# Patient Record
Sex: MALE | Race: OTHER | NOT HISPANIC OR LATINO | URBAN - METROPOLITAN AREA
[De-identification: names, ages, dates, MRNs, and addresses within clinical notes are randomized per-mention and may not be internally consistent; named-entity substitution may affect disease eponyms.]

---

## 2024-05-15 ENCOUNTER — INPATIENT (INPATIENT)
Facility: HOSPITAL | Age: 69
LOS: 0 days | Discharge: HOME | DRG: 245 | End: 2024-05-16
Attending: INTERNAL MEDICINE | Admitting: INTERNAL MEDICINE
Payer: MEDICARE

## 2024-05-15 VITALS
DIASTOLIC BLOOD PRESSURE: 68 MMHG | SYSTOLIC BLOOD PRESSURE: 108 MMHG | TEMPERATURE: 98 F | HEART RATE: 77 BPM | RESPIRATION RATE: 18 BRPM | OXYGEN SATURATION: 97 %

## 2024-05-15 LAB
ISTAT INR: 1.2 — HIGH (ref 0.88–1.16)
ISTAT PT: 13.9 SEC — HIGH (ref 10–12.9)
ISTAT VENOUS BE: 3 MMOL/L — SIGNIFICANT CHANGE UP (ref -2–3)
ISTAT VENOUS GLUCOSE: 97 MG/DL — SIGNIFICANT CHANGE UP (ref 70–99)
ISTAT VENOUS HCO3: 31 MMOL/L — HIGH (ref 23–28)
ISTAT VENOUS HEMATOCRIT: 40 % — SIGNIFICANT CHANGE UP (ref 39–50)
ISTAT VENOUS HEMOGLOBIN: 13.6 GM/DL — SIGNIFICANT CHANGE UP (ref 13–17)
ISTAT VENOUS IONIZED CALCIUM: 1.28 MMOL/L — SIGNIFICANT CHANGE UP (ref 1.12–1.3)
ISTAT VENOUS PCO2: 61 MMHG — HIGH (ref 41–51)
ISTAT VENOUS PH: 7.32 — SIGNIFICANT CHANGE UP (ref 7.31–7.41)
ISTAT VENOUS PO2: <66 MMHG — LOW (ref 35–40)
ISTAT VENOUS POTASSIUM: 3.8 MMOL/L — SIGNIFICANT CHANGE UP (ref 3.5–5.3)
ISTAT VENOUS SO2: 19 % — SIGNIFICANT CHANGE UP
ISTAT VENOUS SODIUM: 141 MMOL/L — SIGNIFICANT CHANGE UP (ref 135–145)
ISTAT VENOUS TCO2: 33 MMOL/L — HIGH (ref 22–31)
POCT ISTAT CREATININE: 1.4 MG/DL — HIGH (ref 0.5–1.3)

## 2024-05-15 PROCEDURE — 33262 RMVL& REPLC PULSE GEN 1 LEAD: CPT

## 2024-05-15 RX ORDER — CEFAZOLIN SODIUM 1 G
2000 VIAL (EA) INJECTION ONCE
Refills: 0 | Status: COMPLETED | OUTPATIENT
Start: 2024-05-15 | End: 2024-05-15

## 2024-05-15 RX ORDER — CARVEDILOL PHOSPHATE 80 MG/1
25 CAPSULE, EXTENDED RELEASE ORAL EVERY 12 HOURS
Refills: 0 | Status: DISCONTINUED | OUTPATIENT
Start: 2024-05-16 | End: 2024-05-16

## 2024-05-15 RX ORDER — AMLODIPINE BESYLATE 2.5 MG/1
5 TABLET ORAL DAILY
Refills: 0 | Status: DISCONTINUED | OUTPATIENT
Start: 2024-05-16 | End: 2024-05-16

## 2024-05-15 NOTE — PATIENT PROFILE ADULT - HOW PATIENT ADDRESSED, PROFILE
Brain rest protocol next 5 days  No cell phone no reading, minimize car travel, limit TV to 2 hours a day 
Capo

## 2024-05-15 NOTE — H&P ADULT - ASSESSMENT
Capo Garcia is a 68 year old male with CHF s/p [employer] single chamber ICD presenting to the EP lab for generator change. He does not have an escort home, he will be staying over night.     Risks and benefits of procedure were explained at length to patient. Risks including but not limited to bleeding, hematoma, vascular injury, infection, PTX, myocardial injury, cardiac perforation / pericardial effusion that may require emergent surgery were explained.  Informed consent signed.

## 2024-05-15 NOTE — H&P ADULT - HISTORY OF PRESENT ILLNESS
Mr. Garcia is a 68 year old gentleman who presents to the EP lab for BS single chamber ICD generator change. He has history of  dilated cardiomyopathy, CHF, s/p ICD. On his remote interrogation of his device, AVRIL was noted. Patient is on Hctz 12.5mg PO daily, carvedilol 25mg PO daily, Enalapril 20mg PO daily, Amlodipine 5mg PO daily, and Livalo 1mg PO daily.

## 2024-05-15 NOTE — PATIENT PROFILE ADULT - FALL HARM RISK - HARM RISK INTERVENTIONS

## 2024-05-16 ENCOUNTER — TRANSCRIPTION ENCOUNTER (OUTPATIENT)
Age: 69
End: 2024-05-16

## 2024-05-16 VITALS
HEART RATE: 58 BPM | SYSTOLIC BLOOD PRESSURE: 138 MMHG | DIASTOLIC BLOOD PRESSURE: 67 MMHG | TEMPERATURE: 98 F | RESPIRATION RATE: 19 BRPM

## 2024-05-16 PROCEDURE — 82565 ASSAY OF CREATININE: CPT

## 2024-05-16 PROCEDURE — 85014 HEMATOCRIT: CPT

## 2024-05-16 PROCEDURE — 82330 ASSAY OF CALCIUM: CPT

## 2024-05-16 PROCEDURE — 84132 ASSAY OF SERUM POTASSIUM: CPT

## 2024-05-16 PROCEDURE — 82803 BLOOD GASES ANY COMBINATION: CPT

## 2024-05-16 PROCEDURE — 84295 ASSAY OF SERUM SODIUM: CPT

## 2024-05-16 PROCEDURE — C1722: CPT

## 2024-05-16 PROCEDURE — 82947 ASSAY GLUCOSE BLOOD QUANT: CPT

## 2024-05-16 PROCEDURE — C1889: CPT

## 2024-05-16 PROCEDURE — 33262 RMVL& REPLC PULSE GEN 1 LEAD: CPT

## 2024-05-16 PROCEDURE — 85610 PROTHROMBIN TIME: CPT

## 2024-05-16 RX ORDER — HYDROCHLOROTHIAZIDE 25 MG
1 TABLET ORAL
Qty: 0 | Refills: 0 | DISCHARGE

## 2024-05-16 RX ORDER — CARVEDILOL PHOSPHATE 80 MG/1
1 CAPSULE, EXTENDED RELEASE ORAL
Qty: 0 | Refills: 0 | DISCHARGE

## 2024-05-16 RX ORDER — PITAVASTATIN CALCIUM 1.04 MG/1
1 TABLET, FILM COATED ORAL
Qty: 0 | Refills: 0 | DISCHARGE

## 2024-05-16 RX ORDER — AMLODIPINE BESYLATE 2.5 MG/1
1 TABLET ORAL
Qty: 0 | Refills: 0 | DISCHARGE

## 2024-05-16 RX ADMIN — AMLODIPINE BESYLATE 5 MILLIGRAM(S): 2.5 TABLET ORAL at 05:25

## 2024-05-16 RX ADMIN — CARVEDILOL PHOSPHATE 25 MILLIGRAM(S): 80 CAPSULE, EXTENDED RELEASE ORAL at 05:25

## 2024-05-16 RX ADMIN — Medication 20 MILLIGRAM(S): at 06:25

## 2024-05-16 NOTE — DISCHARGE NOTE PROVIDER - NSDCCPTREATMENT_GEN_ALL_CORE_FT
PRINCIPAL PROCEDURE  Procedure: Replacement or removal of implantable cardioverter-defibrillator (ICD) battery  Findings and Treatment:

## 2024-05-16 NOTE — DISCHARGE NOTE NURSING/CASE MANAGEMENT/SOCIAL WORK - NSDCPEFALRISK_GEN_ALL_CORE
For information on Fall & Injury Prevention, visit: [email or web address] OR  [email or web address] OR  https://www.cdc.gov/steadi/patient.html

## 2024-05-16 NOTE — DISCHARGE NOTE PROVIDER - NSDCMRMEDTOKEN_GEN_ALL_CORE_FT
amLODIPine 5 mg oral tablet: 1 tab(s) orally  carvedilol 25 mg oral tablet: 1 tab(s) orally  enalapril 20 mg oral tablet: 1 tab(s) orally  hydroCHLOROthiazide 12.5 mg oral tablet: 1 tab(s) orally once a day  Livalo 1 mg oral tablet: 1 tab(s) orally   amLODIPine 5 mg oral tablet: 1 tab(s) orally once a day  carvedilol 25 mg oral tablet: 1 tab(s) orally once a day  enalapril 20 mg oral tablet: 1 tab(s) orally once a day  hydroCHLOROthiazide 12.5 mg oral tablet: 1 tab(s) orally once a day  Livalo 1 mg oral tablet: 1 tab(s) orally once a day

## 2024-05-16 NOTE — DISCHARGE NOTE NURSING/CASE MANAGEMENT/SOCIAL WORK - PATIENT PORTAL LINK FT
You can access the FollowMyHealth Patient Portal offered by [facility] by registering at the following website: [email or web address]. By joining [facility]’s FollowMyHealth portal, you will also be able to view your health information using other applications (apps) compatible with our system.

## 2024-05-16 NOTE — DISCHARGE NOTE NURSING/CASE MANAGEMENT/SOCIAL WORK - NSDCFUADDAPPT_GEN_ALL_CORE_FT
Follow up Dr. Diaz , call his office [phone] to schedule an appointment or if you have questions.   Avoid heavy lifting, exercise and strenuous activity for 4 weeks   Showering is OK.

## 2024-05-16 NOTE — DISCHARGE NOTE PROVIDER - HOSPITAL COURSE
68 year old gentleman who presents to the EP lab for BS single chamber ICD generator change. He has history of  dilated cardiomyopathy, CHF, s/p ICD. On his remote interrogation of his device, AVRIL was noted. Patient is on Hctz 12.5mg PO daily, carvedilol 25mg PO daily, Enalapril 20mg PO daily, Amlodipine 5mg PO daily, and Livalo 1mg PO daily.    68 year old gentleman who presents to the EP lab for BS single chamber ICD generator change. He has history of  dilated cardiomyopathy, CHF, s/p ICD. On his remote interrogation of his device, AVRIL was noted. Patient is on Hctz 12.5mg PO daily, carvedilol 25mg PO daily, Enalapril 20mg PO daily, Amlodipine 5mg PO daily, and Livalo 1mg PO daily. Patient had successful ICD generator change 5/15/24, there were no complications.  Implant site no bleeding, no swelling, no hematoma, patient was monitored no telemetry overnight, stable for discharge

## 2024-05-16 NOTE — DISCHARGE NOTE PROVIDER - CARE PROVIDER_API CALL
Booker Diaz Beatriz  Cardiovascular Disease  [address], [address] [place] [address]  Phone: [phone]  Fax: [phone]  Follow Up Time:

## 2024-05-16 NOTE — DISCHARGE NOTE PROVIDER - NSDCFUADDAPPT_GEN_ALL_CORE_FT
Follow up Dr. Diaz , call his office [phone] to schedule an appointment or if you have questions.   Avoid heavy lifting, exercise and strenuous activity for 4 weeks   Showering is OK.

## 2024-05-16 NOTE — DISCHARGE NOTE PROVIDER - NSDCCPCAREPLAN_GEN_ALL_CORE_FT
PRINCIPAL DISCHARGE DIAGNOSIS  Diagnosis: ICD (implantable cardioverter-defibrillator) battery depletion  Assessment and Plan of Treatment:

## 2024-05-23 DIAGNOSIS — Z45.02 ENCOUNTER FOR ADJUSTMENT AND MANAGEMENT OF AUTOMATIC IMPLANTABLE CARDIAC DEFIBRILLATOR: ICD-10-CM

## 2024-05-23 DIAGNOSIS — I50.22 CHRONIC SYSTOLIC (CONGESTIVE) HEART FAILURE: ICD-10-CM

## 2024-05-23 DIAGNOSIS — I11.0 HYPERTENSIVE HEART DISEASE WITH HEART FAILURE: ICD-10-CM

## 2024-05-23 DIAGNOSIS — I42.0 DILATED CARDIOMYOPATHY: ICD-10-CM
